# Patient Record
Sex: MALE | Race: BLACK OR AFRICAN AMERICAN | Employment: UNEMPLOYED | URBAN - METROPOLITAN AREA
[De-identification: names, ages, dates, MRNs, and addresses within clinical notes are randomized per-mention and may not be internally consistent; named-entity substitution may affect disease eponyms.]

---

## 2022-07-08 ENCOUNTER — HOSPITAL ENCOUNTER (EMERGENCY)
Age: 27
Discharge: LEFT BEFORE DISCHARGE | End: 2022-07-08
Payer: COMMERCIAL

## 2022-07-08 VITALS
SYSTOLIC BLOOD PRESSURE: 117 MMHG | OXYGEN SATURATION: 100 % | DIASTOLIC BLOOD PRESSURE: 80 MMHG | WEIGHT: 140 LBS | RESPIRATION RATE: 16 BRPM | HEART RATE: 61 BPM | HEIGHT: 67 IN | BODY MASS INDEX: 21.97 KG/M2 | TEMPERATURE: 97.5 F

## 2022-07-08 PROCEDURE — 99281 EMR DPT VST MAYX REQ PHY/QHP: CPT

## 2022-07-08 NOTE — ED NOTES
Department of Emergency Medicine  FIRST PROVIDER TRIAGE NOTE             Independent MICHAEL           7/8/22  2:18 PM EDT    Date of Encounter: 7/8/22   MRN: [id number]      HPI: Perla Clark is a 31 y.o. male who presents to the ED for Abdominal Pain (symptoms started this am), Emesis, and Diarrhea  Patient was found lying in the parking lot in the mulch of the emergency department. Patient complains of diffuse abdominal pain, nausea and vomiting which began this morning. Denies any fevers or chills. Patient states that he uses tramadol daily, and took 1 tramadol this morning, but denies any other drug use. ROS: Negative for cp, sob, back pain or fever. PE: Gen Appearance/Constitutional: alert  CV: regular rate  Pulm: CTA bilat  GI: soft and NT. No distention, and nontender upon palpation. Initial Plan of Care: All treatment areas with department are currently occupied. Plan to order/Initiate the following while awaiting opening in ED: labs.   Initiate Treatment-Testing, Proceed toTreatment Area When Bed Available for ED Attending/MLP to Continue Care    Electronically signed by FRANCISCO J Mcallister CNP   DD: 7/8/22         FRANCISCO J Mcallister CNP  07/08/22 [id number]